# Patient Record
Sex: MALE | Race: WHITE | NOT HISPANIC OR LATINO | URBAN - METROPOLITAN AREA
[De-identification: names, ages, dates, MRNs, and addresses within clinical notes are randomized per-mention and may not be internally consistent; named-entity substitution may affect disease eponyms.]

---

## 2020-06-25 ENCOUNTER — OFFICE VISIT (OUTPATIENT)
Dept: URBAN - METROPOLITAN AREA CLINIC 113 | Facility: CLINIC | Age: 50
End: 2020-06-25

## 2020-06-29 ENCOUNTER — OFFICE VISIT (OUTPATIENT)
Dept: URBAN - METROPOLITAN AREA SURGERY CENTER 25 | Facility: SURGERY CENTER | Age: 50
End: 2020-06-29

## 2020-06-29 ENCOUNTER — CLAIMS CREATED FROM THE CLAIM WINDOW (OUTPATIENT)
Dept: URBAN - METROPOLITAN AREA CLINIC 4 | Facility: CLINIC | Age: 50
End: 2020-06-29
Payer: COMMERCIAL

## 2020-06-29 DIAGNOSIS — K29.70 GASTRITIS, UNSPECIFIED, WITHOUT BLEEDING: ICD-10-CM

## 2020-06-29 DIAGNOSIS — K22.10 ULCER OF ESOPHAGUS WITHOUT BLEEDING: ICD-10-CM

## 2020-06-29 DIAGNOSIS — K22.719 BARRETT'S ESOPHAGUS WITH DYSPLASIA, UNSPECIFIED: ICD-10-CM

## 2020-06-29 PROCEDURE — 88342 IMHCHEM/IMCYTCHM 1ST ANTB: CPT | Performed by: PATHOLOGY

## 2020-06-29 PROCEDURE — 88305 TISSUE EXAM BY PATHOLOGIST: CPT | Performed by: PATHOLOGY

## 2020-06-29 PROCEDURE — 88312 SPECIAL STAINS GROUP 1: CPT | Performed by: PATHOLOGY

## 2020-07-02 ENCOUNTER — OFFICE VISIT (OUTPATIENT)
Dept: URBAN - METROPOLITAN AREA SURGERY CENTER 25 | Facility: SURGERY CENTER | Age: 50
End: 2020-07-02

## 2020-07-13 ENCOUNTER — OFFICE VISIT (OUTPATIENT)
Dept: URBAN - METROPOLITAN AREA CLINIC 113 | Facility: CLINIC | Age: 50
End: 2020-07-13

## 2020-07-25 ENCOUNTER — TELEPHONE ENCOUNTER (OUTPATIENT)
Dept: URBAN - METROPOLITAN AREA CLINIC 13 | Facility: CLINIC | Age: 50
End: 2020-07-25

## 2020-07-25 RX ORDER — OXYCODONE AND ACETAMINOPHEN 5; 325 MG/1; MG/1
TAKE 2 TABLET BEDTIME TABLET ORAL
Refills: 0 | OUTPATIENT
Start: 2020-06-16 | End: 2020-07-13

## 2020-07-25 RX ORDER — FLUCONAZOLE 100 MG/1
TAKE 1 TABLET DAILY TABLET ORAL
Qty: 7 | Refills: 0 | OUTPATIENT
Start: 2020-06-25 | End: 2020-07-13

## 2020-07-25 RX ORDER — SUCRALFATE 1 G/10ML
TAKE 10 ML 3 TIMES DAILY SUSPENSION ORAL
Qty: 840 | Refills: 2 | OUTPATIENT
Start: 2020-06-29 | End: 2020-07-13

## 2020-07-25 RX ORDER — OMEPRAZOLE 40 MG/1
TAKE (1) CAPSULE TWICE DAILY CAPSULE, DELAYED RELEASE ORAL
Qty: 180 | Refills: 3 | OUTPATIENT
Start: 2020-06-25 | End: 2020-07-13

## 2020-07-25 RX ORDER — AZELASTINE HYDROCHLORIDE 137 UG/1
USE 1 TO 2 SPRAYS IN EACH NOSTRIL TWICE DAILY AS NEEDED SPRAY, METERED NASAL
Refills: 0 | OUTPATIENT
Start: 2020-04-16 | End: 2020-07-13

## 2020-07-25 RX ORDER — OMEPRAZOLE 20 MG/1
TAKE 1 TABLET DAILY TABLET, DELAYED RELEASE ORAL
Refills: 0 | OUTPATIENT
End: 2020-06-25

## 2020-07-26 ENCOUNTER — TELEPHONE ENCOUNTER (OUTPATIENT)
Dept: URBAN - METROPOLITAN AREA CLINIC 13 | Facility: CLINIC | Age: 50
End: 2020-07-26

## 2020-07-26 RX ORDER — LIDOCAINE HYDROCHLORIDE 20 MG/ML
SOLUTION ORAL; TOPICAL
Qty: 200 | Refills: 0 | Status: ACTIVE | COMMUNITY
Start: 2020-05-20

## 2020-07-26 RX ORDER — TRIAMCINOLONE ACETONIDE 1 MG/G
CREAM TOPICAL
Qty: 454 | Refills: 0 | Status: ACTIVE | COMMUNITY
Start: 2020-04-24

## 2020-07-26 RX ORDER — IBUPROFEN 800 MG/1
TAKE 1 TABLET BY MOUTH THREE TIMES DAILY TABLET ORAL
Qty: 30 | Refills: 0 | Status: ACTIVE | COMMUNITY
Start: 2019-12-09

## 2020-07-26 RX ORDER — METHYLPREDNISOLONE 4 MG/1
TABLET ORAL
Qty: 21 | Refills: 0 | Status: ACTIVE | COMMUNITY
Start: 2020-04-21

## 2020-07-26 RX ORDER — HYDROCODONE BITARTRATE AND ACETAMINOPHEN 7.5; 325 MG/15ML; MG/15ML
SOLUTION ORAL
Qty: 480 | Refills: 0 | Status: ACTIVE | COMMUNITY
Start: 2020-05-12

## 2020-07-26 RX ORDER — ONDANSETRON 8 MG/1
TABLET ORAL
Qty: 30 | Refills: 0 | Status: ACTIVE | COMMUNITY
Start: 2020-05-06

## 2020-07-26 RX ORDER — APIXABAN 5 MG/1
TAKE 1 TABLET TWICE DAILY TABLET, FILM COATED ORAL
Refills: 0 | Status: ACTIVE | COMMUNITY
Start: 2020-06-09

## 2020-07-26 RX ORDER — TRIAMCINOLONE ACETONIDE 1 MG/G
CREAM TOPICAL
Qty: 454 | Refills: 0 | Status: ACTIVE | COMMUNITY
Start: 2020-04-25

## 2020-07-26 RX ORDER — NAPROXEN SODIUM 220 MG
TAKE 1-2 TABLET DAILY PRN TABLET ORAL
Refills: 0 | Status: ACTIVE | COMMUNITY

## 2020-07-26 RX ORDER — ONDANSETRON 8 MG/1
TABLET ORAL
Qty: 30 | Refills: 0 | Status: ACTIVE | COMMUNITY
Start: 2020-04-13

## 2020-07-26 RX ORDER — CLINDAMYCIN HYDROCHLORIDE 300 MG/1
CAPSULE ORAL
Qty: 21 | Refills: 0 | Status: ACTIVE | COMMUNITY
Start: 2020-04-08

## 2020-07-26 RX ORDER — PANTOPRAZOLE SODIUM 40 MG/1
TAKE 1 TABLET 30 MINUTES BEFORE BREAKFAST DAILY TABLET, DELAYED RELEASE ORAL
Qty: 30 | Refills: 3 | Status: ACTIVE | COMMUNITY
Start: 2020-07-13

## 2020-07-26 RX ORDER — DOXYCYCLINE HYCLATE 100 MG/1
TAKE 1 TABLET BY MOUTH TWICE DAILY FOR 10 DAYS TABLET ORAL
Qty: 20 | Refills: 0 | Status: ACTIVE | COMMUNITY
Start: 2019-12-27

## 2020-07-26 RX ORDER — SUCRALFATE 1 G/10ML
SUSPENSION ORAL
Qty: 420 | Refills: 0 | Status: ACTIVE | COMMUNITY
Start: 2020-06-03

## 2020-07-26 RX ORDER — PROMETHAZINE HYDROCHLORIDE 25 MG/1
TABLET ORAL
Qty: 30 | Refills: 0 | Status: ACTIVE | COMMUNITY
Start: 2020-05-28

## 2020-07-26 RX ORDER — PROMETHAZINE HYDROCHLORIDE 25 MG/1
TABLET ORAL
Qty: 30 | Refills: 0 | Status: ACTIVE | COMMUNITY
Start: 2020-05-12

## 2020-08-20 ENCOUNTER — OFFICE VISIT (OUTPATIENT)
Dept: URBAN - METROPOLITAN AREA CLINIC 113 | Facility: CLINIC | Age: 50
End: 2020-08-20

## 2020-12-30 ENCOUNTER — OFFICE VISIT (OUTPATIENT)
Dept: URBAN - METROPOLITAN AREA CLINIC 113 | Facility: CLINIC | Age: 50
End: 2020-12-30
Payer: COMMERCIAL

## 2020-12-30 VITALS
DIASTOLIC BLOOD PRESSURE: 87 MMHG | BODY MASS INDEX: 30.79 KG/M2 | TEMPERATURE: 99.5 F | RESPIRATION RATE: 22 BRPM | HEART RATE: 89 BPM | SYSTOLIC BLOOD PRESSURE: 127 MMHG | HEIGHT: 75 IN | WEIGHT: 247.6 LBS

## 2020-12-30 DIAGNOSIS — R19.7 DIARRHEA, UNSPECIFIED TYPE: ICD-10-CM

## 2020-12-30 DIAGNOSIS — K62.5 RECTAL BLEEDING: ICD-10-CM

## 2020-12-30 PROCEDURE — 99213 OFFICE O/P EST LOW 20 MIN: CPT | Performed by: INTERNAL MEDICINE

## 2020-12-30 RX ORDER — DOXYCYCLINE HYCLATE 100 MG/1
TAKE 1 TABLET BY MOUTH TWICE DAILY FOR 10 DAYS TABLET ORAL
Qty: 20 | Refills: 0 | Status: DISCONTINUED | COMMUNITY
Start: 2019-12-27

## 2020-12-30 RX ORDER — NAPROXEN SODIUM 220 MG
TAKE 1-2 TABLET DAILY PRN TABLET ORAL
Refills: 0 | Status: DISCONTINUED | COMMUNITY

## 2020-12-30 RX ORDER — METHYLPREDNISOLONE 4 MG/1
TABLET ORAL
Qty: 21 | Refills: 0 | Status: DISCONTINUED | COMMUNITY
Start: 2020-04-21

## 2020-12-30 RX ORDER — PANTOPRAZOLE SODIUM 40 MG/1
TAKE 1 TABLET 30 MINUTES BEFORE BREAKFAST DAILY TABLET, DELAYED RELEASE ORAL
Qty: 30 | Refills: 3 | Status: DISCONTINUED | COMMUNITY
Start: 2020-07-13

## 2020-12-30 RX ORDER — SODIUM, POTASSIUM,MAG SULFATES 17.5-3.13G
354 ML SOLUTION, RECONSTITUTED, ORAL ORAL ONCE
Qty: 354 ML | Refills: 0 | OUTPATIENT
Start: 2020-12-30

## 2020-12-30 RX ORDER — APIXABAN 5 MG/1
TAKE 1 TABLET TWICE DAILY TABLET, FILM COATED ORAL
Refills: 0 | Status: DISCONTINUED | COMMUNITY
Start: 2020-06-09

## 2020-12-30 RX ORDER — CLINDAMYCIN HYDROCHLORIDE 300 MG/1
CAPSULE ORAL
Qty: 21 | Refills: 0 | Status: DISCONTINUED | COMMUNITY
Start: 2020-04-08

## 2020-12-30 RX ORDER — LIDOCAINE HYDROCHLORIDE 20 MG/ML
SOLUTION ORAL; TOPICAL
Qty: 200 | Refills: 0 | Status: DISCONTINUED | COMMUNITY
Start: 2020-05-20

## 2020-12-30 RX ORDER — TRIAMCINOLONE ACETONIDE 1 MG/G
CREAM TOPICAL
Qty: 454 | Refills: 0 | Status: DISCONTINUED | COMMUNITY
Start: 2020-04-24

## 2020-12-30 RX ORDER — HYDROCODONE BITARTRATE AND ACETAMINOPHEN 7.5; 325 MG/15ML; MG/15ML
SOLUTION ORAL
Qty: 480 | Refills: 0 | Status: DISCONTINUED | COMMUNITY
Start: 2020-05-12

## 2020-12-30 RX ORDER — SUCRALFATE 1 G/10ML
SUSPENSION ORAL
Qty: 420 | Refills: 0 | Status: DISCONTINUED | COMMUNITY
Start: 2020-06-03

## 2020-12-30 RX ORDER — PROMETHAZINE HYDROCHLORIDE 25 MG/1
TABLET ORAL
Qty: 30 | Refills: 0 | Status: DISCONTINUED | COMMUNITY
Start: 2020-05-12

## 2020-12-30 RX ORDER — ONDANSETRON 8 MG/1
TABLET ORAL
Qty: 30 | Refills: 0 | Status: DISCONTINUED | COMMUNITY
Start: 2020-04-13

## 2020-12-30 RX ORDER — IBUPROFEN 800 MG/1
TAKE 1 TABLET BY MOUTH THREE TIMES DAILY TABLET ORAL
Qty: 30 | Refills: 0 | Status: DISCONTINUED | COMMUNITY
Start: 2019-12-09

## 2020-12-30 NOTE — HPI-TODAY'S VISIT:
49 y/o male presenting for f/u. He was last seen in July 2020 for GERD and abdominal pain. We had recommended to complete his colonoscopy in 3 months and a repeat EGD in 1 year.  Since immune therapy he has had increased bowel movements (up to 5 times per day) His bowel movements are typically solid but more frequent/soft/pasty. He also gets some rectal bleeding.   ----------------------------- 7/14/2020 50-year-old male presenting for follow-up after upper endoscopy. Initially presented with pain while swallowing. He does have a past history of lung cancer status post chemoradiation. He was found to have severe esophagitis on upper endoscopy likely secondary to radiation therapies. Biopsies throughout his stomach as well as the esophagus were obtained. He was found to have ulcerative reflux esophagitis. He was placed on twice a day PPI therapy as well as Carafate. He was also treated with Diflucan prior to his endoscopy secondary to thrush. He is here today for follow-up after upper endoscopy. He states that his dysphagia and odynophagia have resolved. He denies any trouble swallowing and does not have any heartburn or reflux. He has had a colonoscopy over 30 years ago and is due for a colonoscopy for screening purposes. He denies any rectal bleeding, weight loss, or abdominal pain.

## 2021-01-05 LAB
C DIFFICILE TOXIN GENE NAA: NEGATIVE
CAMPYLOBACTER CULTURE: (no result)
E COLI SHIGA TOXIN EIA: NEGATIVE
Lab: (no result)
Lab: (no result)
SALMONELLA/SHIGELLA SCREEN: (no result)

## 2021-01-21 ENCOUNTER — OFFICE VISIT (OUTPATIENT)
Dept: URBAN - METROPOLITAN AREA SURGERY CENTER 25 | Facility: SURGERY CENTER | Age: 51
End: 2021-01-21

## 2021-01-27 ENCOUNTER — TELEPHONE ENCOUNTER (OUTPATIENT)
Dept: URBAN - METROPOLITAN AREA CLINIC 113 | Facility: CLINIC | Age: 51
End: 2021-01-27

## 2021-02-08 ENCOUNTER — OFFICE VISIT (OUTPATIENT)
Dept: URBAN - METROPOLITAN AREA SURGERY CENTER 25 | Facility: SURGERY CENTER | Age: 51
End: 2021-02-08

## 2021-02-12 ENCOUNTER — TELEPHONE ENCOUNTER (OUTPATIENT)
Dept: URBAN - METROPOLITAN AREA CLINIC 113 | Facility: CLINIC | Age: 51
End: 2021-02-12

## 2021-02-12 RX ORDER — SODIUM, POTASSIUM,MAG SULFATES 17.5-3.13G
354 ML SOLUTION, RECONSTITUTED, ORAL ORAL ONCE
Qty: 354 ML | Refills: 0
Start: 2020-12-30

## 2021-02-17 ENCOUNTER — OFFICE VISIT (OUTPATIENT)
Dept: URBAN - METROPOLITAN AREA SURGERY CENTER 25 | Facility: SURGERY CENTER | Age: 51
End: 2021-02-17
Payer: COMMERCIAL

## 2021-02-17 DIAGNOSIS — D12.8 ADENOMATOUS POLYP OF RECTUM: ICD-10-CM

## 2021-02-17 DIAGNOSIS — R19.7 ACUTE DIARRHEA: ICD-10-CM

## 2021-02-17 PROCEDURE — G8907 PT DOC NO EVENTS ON DISCHARG: HCPCS | Performed by: INTERNAL MEDICINE

## 2021-02-17 PROCEDURE — 45380 COLONOSCOPY AND BIOPSY: CPT | Performed by: INTERNAL MEDICINE

## 2021-02-17 PROCEDURE — 45385 COLONOSCOPY W/LESION REMOVAL: CPT | Performed by: INTERNAL MEDICINE

## 2021-02-17 RX ORDER — SODIUM, POTASSIUM,MAG SULFATES 17.5-3.13G
354 ML SOLUTION, RECONSTITUTED, ORAL ORAL ONCE
Qty: 354 ML | Refills: 0 | Status: ACTIVE | COMMUNITY
Start: 2020-12-30

## 2021-03-10 ENCOUNTER — DASHBOARD ENCOUNTERS (OUTPATIENT)
Age: 51
End: 2021-03-10

## 2021-03-10 ENCOUNTER — OFFICE VISIT (OUTPATIENT)
Dept: URBAN - METROPOLITAN AREA CLINIC 113 | Facility: CLINIC | Age: 51
End: 2021-03-10
Payer: COMMERCIAL

## 2021-03-10 VITALS
TEMPERATURE: 98 F | BODY MASS INDEX: 31.95 KG/M2 | SYSTOLIC BLOOD PRESSURE: 142 MMHG | RESPIRATION RATE: 20 BRPM | HEIGHT: 75 IN | WEIGHT: 257 LBS | HEART RATE: 76 BPM | DIASTOLIC BLOOD PRESSURE: 91 MMHG

## 2021-03-10 DIAGNOSIS — K62.5 RECTAL BLEEDING: ICD-10-CM

## 2021-03-10 DIAGNOSIS — D36.9 TUBULAR ADENOMA: ICD-10-CM

## 2021-03-10 DIAGNOSIS — R19.7 DIARRHEA, UNSPECIFIED TYPE: ICD-10-CM

## 2021-03-10 PROBLEM — 12063002: Status: ACTIVE | Noted: 2020-12-30

## 2021-03-10 PROBLEM — 62315008: Status: ACTIVE | Noted: 2020-12-30

## 2021-03-10 PROBLEM — 444408007: Status: ACTIVE | Noted: 2021-03-10

## 2021-03-10 PROCEDURE — 99213 OFFICE O/P EST LOW 20 MIN: CPT | Performed by: INTERNAL MEDICINE

## 2021-03-10 RX ORDER — NAPROXEN SODIUM 220 MG/1
1 TABLET WITH FOOD OR MILK AS NEEDED TABLET ORAL
Status: ACTIVE | COMMUNITY

## 2021-03-10 RX ORDER — SODIUM, POTASSIUM,MAG SULFATES 17.5-3.13G
354 ML SOLUTION, RECONSTITUTED, ORAL ORAL ONCE
Qty: 354 ML | Refills: 0 | Status: DISCONTINUED | COMMUNITY
Start: 2020-12-30

## 2021-03-10 NOTE — HPI-TODAY'S VISIT:
50 y/o male presenting for f/u.  Patient is without any abdominal complaints. No dysphagia, heartburn, regurgitation, unintentional weight loss, nausea, vomiting, hematemesis or melena. Bowels are moving regularly without blood per rectum. No complaints of bloating. No jaundice, icterus. There have been no new complaints of cough, fever, joint pain or muscle aches.  12/20/2020 49 y/o male presenting for f/u. He was last seen in July 2020 for GERD and abdominal pain. We had recommended to complete his colonoscopy in 3 months and a repeat EGD in 1 year.  Since immune therapy he has had increased bowel movements (up to 5 times per day) His bowel movements are typically solid but more frequent/soft/pasty. He also gets some rectal bleeding.   ----------------------------- 7/14/2020 50-year-old male presenting for follow-up after upper endoscopy. Initially presented with pain while swallowing. He does have a past history of lung cancer status post chemoradiation. He was found to have severe esophagitis on upper endoscopy likely secondary to radiation therapies. Biopsies throughout his stomach as well as the esophagus were obtained. He was found to have ulcerative reflux esophagitis. He was placed on twice a day PPI therapy as well as Carafate. He was also treated with Diflucan prior to his endoscopy secondary to thrush. He is here today for follow-up after upper endoscopy. He states that his dysphagia and odynophagia have resolved. He denies any trouble swallowing and does not have any heartburn or reflux. He has had a colonoscopy over 30 years ago and is due for a colonoscopy for screening purposes. He denies any rectal bleeding, weight loss, or abdominal pain.